# Patient Record
Sex: FEMALE
[De-identification: names, ages, dates, MRNs, and addresses within clinical notes are randomized per-mention and may not be internally consistent; named-entity substitution may affect disease eponyms.]

---

## 2020-08-28 ENCOUNTER — HOSPITAL ENCOUNTER (EMERGENCY)
Dept: HOSPITAL 5 - ED | Age: 25
Discharge: HOME | End: 2020-08-28
Payer: SELF-PAY

## 2020-08-28 VITALS — DIASTOLIC BLOOD PRESSURE: 81 MMHG | SYSTOLIC BLOOD PRESSURE: 124 MMHG

## 2020-08-28 DIAGNOSIS — F41.9: ICD-10-CM

## 2020-08-28 DIAGNOSIS — R00.2: ICD-10-CM

## 2020-08-28 DIAGNOSIS — Z88.6: ICD-10-CM

## 2020-08-28 DIAGNOSIS — N39.0: ICD-10-CM

## 2020-08-28 DIAGNOSIS — R53.1: ICD-10-CM

## 2020-08-28 DIAGNOSIS — B37.3: Primary | ICD-10-CM

## 2020-08-28 DIAGNOSIS — Z79.899: ICD-10-CM

## 2020-08-28 DIAGNOSIS — R11.0: ICD-10-CM

## 2020-08-28 LAB
ALBUMIN SERPL-MCNC: 4.8 G/DL (ref 3.9–5)
ALT SERPL-CCNC: 12 UNITS/L (ref 7–56)
BACTERIA #/AREA URNS HPF: (no result) /HPF
BASOPHILS # (AUTO): 0.1 K/MM3 (ref 0–0.1)
BASOPHILS NFR BLD AUTO: 0.5 % (ref 0–1.8)
BILIRUB UR QL STRIP: (no result)
BLOOD UR QL VISUAL: (no result)
BUN SERPL-MCNC: 11 MG/DL (ref 7–17)
BUN/CREAT SERPL: 14 %
CALCIUM SERPL-MCNC: 9.8 MG/DL (ref 8.4–10.2)
EOSINOPHIL # BLD AUTO: 0 K/MM3 (ref 0–0.4)
EOSINOPHIL NFR BLD AUTO: 0 % (ref 0–4.3)
HCT VFR BLD CALC: 44.9 % (ref 30.3–42.9)
HEMOLYSIS INDEX: 15
HGB BLD-MCNC: 15.6 GM/DL (ref 10.1–14.3)
HYALINE CASTS #/AREA URNS LPF: 6 /LPF
LYMPHOCYTES # BLD AUTO: 1.5 K/MM3 (ref 1.2–5.4)
LYMPHOCYTES NFR BLD AUTO: 11 % (ref 13.4–35)
MCHC RBC AUTO-ENTMCNC: 35 % (ref 30–34)
MCV RBC AUTO: 90 FL (ref 79–97)
MONOCYTES # (AUTO): 0.7 K/MM3 (ref 0–0.8)
MONOCYTES % (AUTO): 5.4 % (ref 0–7.3)
MUCOUS THREADS #/AREA URNS HPF: (no result) /HPF
PH UR STRIP: 6 [PH] (ref 5–7)
PLATELET # BLD: 277 K/MM3 (ref 140–440)
PROT UR STRIP-MCNC: (no result) MG/DL
RBC # BLD AUTO: 5 M/MM3 (ref 3.65–5.03)
RBC #/AREA URNS HPF: 14 /HPF (ref 0–6)
UROBILINOGEN UR-MCNC: < 2 MG/DL (ref ?–2)
WBC #/AREA URNS HPF: 40 /HPF (ref 0–6)

## 2020-08-28 PROCEDURE — 80053 COMPREHEN METABOLIC PANEL: CPT

## 2020-08-28 PROCEDURE — 83735 ASSAY OF MAGNESIUM: CPT

## 2020-08-28 PROCEDURE — 82550 ASSAY OF CK (CPK): CPT

## 2020-08-28 PROCEDURE — 81001 URINALYSIS AUTO W/SCOPE: CPT

## 2020-08-28 PROCEDURE — 84443 ASSAY THYROID STIM HORMONE: CPT

## 2020-08-28 PROCEDURE — 84702 CHORIONIC GONADOTROPIN TEST: CPT

## 2020-08-28 PROCEDURE — 87086 URINE CULTURE/COLONY COUNT: CPT

## 2020-08-28 PROCEDURE — 93005 ELECTROCARDIOGRAM TRACING: CPT

## 2020-08-28 PROCEDURE — 80307 DRUG TEST PRSMV CHEM ANLYZR: CPT

## 2020-08-28 PROCEDURE — 85025 COMPLETE CBC W/AUTO DIFF WBC: CPT

## 2020-08-28 PROCEDURE — 36415 COLL VENOUS BLD VENIPUNCTURE: CPT

## 2020-08-28 NOTE — EVENT NOTE
ED Screening Note


ED Screening Note: 





generalized weakness


nausea


palpitations/heart racing


couple weeks 


has episodes of anxious heart racing breathing fast


one episode of vomiting


no diarrhea


no fever


no cough 


no abd pain 


not on any medications currently 


caffeine use every day 


PMHx anxiety 


allergy: codeine 


LNMP:a month ago 





This initial assessment/diagnostic orders/clinical plan/treatment(s) is/are 

subject to change based on patients health status, clinical progression and re-

assessment by fellow clinical providers in the ED. Further treatment and workup 

at subsequent clinical providers discretion. Patient/guardian urged not to elope

from the ED as their condition may be serious if not clinically assessed and 

managed. 





Initial orders include: 


labs, UA, EKG

## 2020-08-28 NOTE — EMERGENCY DEPARTMENT REPORT
ED General Adult HPI





- General


Chief complaint: Weakness


Stated complaint: WEAK AND NAUSEA


Time Seen by Provider: 08/28/20 15:28


Source: patient


Mode of arrival: Ambulatory


Limitations: No Limitations





- History of Present Illness


Initial comments: 





pt is a 24 yo female who presents to the ED with c/o generalized weakness for 2 

weeks. she has associated nausea, palpitations/heart racing. she has episodes of

anxious heart racing and breathing fast. states that she had one episode of 

vomiting. she denies any CP, diarrhea, fever, SOB, leg swelling, cough 


or abd pain. not on any medications currently. caffeine use every day. PMHx 

anxiety. allergy: codeine. LNMP:a month ago





- Related Data


                                  Previous Rx's











 Medication  Instructions  Recorded  Last Taken  Type


 


Fluconazole [Diflucan TAB] 200 mg PO QDAY #1 tablet 08/28/20 Unknown Rx


 


cephALEXin [Keflex] 500 mg PO BID 7 Days #14 cap 08/28/20 Unknown Rx











                                    Allergies











Allergy/AdvReac Type Severity Reaction Status Date / Time


 


codeine AdvReac  Itching Verified 08/28/20 12:07














ED Review of Systems


ROS: 


Stated complaint: WEAK AND NAUSEA


Other details as noted in HPI





Comment: All other systems reviewed and negative





ED Past Medical Hx





- Past Medical History


Previous Medical History?: Yes


Additional medical history: anxiety





- Surgical History


Past Surgical History?: No





- Medications


Home Medications: 


                                Home Medications











 Medication  Instructions  Recorded  Confirmed  Last Taken  Type


 


Fluconazole [Diflucan TAB] 200 mg PO QDAY #1 tablet 08/28/20  Unknown Rx


 


cephALEXin [Keflex] 500 mg PO BID 7 Days #14 cap 08/28/20  Unknown Rx














ED Physical Exam





- General


Limitations: No Limitations


General appearance: alert, in no apparent distress





- Head


Head exam: Present: atraumatic, normocephalic





- Eye


Eye exam: Present: normal appearance





- ENT


ENT exam: Present: mucous membranes moist





- Respiratory


Respiratory exam: Present: normal lung sounds bilaterally.  Absent: respiratory 

distress, wheezes, rales, rhonchi, stridor, chest wall tenderness, accessory 

muscle use, decreased breath sounds, prolonged expiratory





- Cardiovascular


Cardiovascular Exam: Present: normal rhythm, tachycardia, normal heart sounds.  

Absent: systolic murmur, diastolic murmur, rubs, gallop





- Neurological Exam


Neurological exam: Present: alert, oriented X3, CN II-XII intact, normal gait.  

Absent: motor sensory deficit





- Psychiatric


Psychiatric exam: Present: normal affect, normal mood





- Skin


Skin exam: Present: warm, dry, intact





ED Course


                                   Vital Signs











  08/28/20 08/28/20





  12:06 16:32


 


Temperature 97.9 F 


 


Pulse Rate 141 H 94 H


 


Respiratory 18 





Rate  


 


Blood Pressure 128/85 


 


Blood Pressure  124/81





[Left]  


 


O2 Sat by Pulse 100 





Oximetry  














ED Medical Decision Making





- Lab Data


Result diagrams: 


                                 08/28/20 13:22





                                 08/28/20 13:22








                                   Lab Results











  08/28/20 08/28/20 08/28/20 Range/Units





  13:22 13:22 13:22 


 


WBC  13.8 H    (4.5-11.0)  K/mm3


 


RBC  5.00    (3.65-5.03)  M/mm3


 


Hgb  15.6 H    (10.1-14.3)  gm/dl


 


Hct  44.9 H    (30.3-42.9)  %


 


MCV  90    (79-97)  fl


 


MCH  31    (28-32)  pg


 


MCHC  35 H    (30-34)  %


 


RDW  12.4 L    (13.2-15.2)  %


 


Plt Count  277    (140-440)  K/mm3


 


Lymph % (Auto)  11.0 L    (13.4-35.0)  %


 


Mono % (Auto)  5.4    (0.0-7.3)  %


 


Eos % (Auto)  0.0    (0.0-4.3)  %


 


Baso % (Auto)  0.5    (0.0-1.8)  %


 


Lymph #  1.5    (1.2-5.4)  K/mm3


 


Mono #  0.7    (0.0-0.8)  K/mm3


 


Eos #  0.0    (0.0-0.4)  K/mm3


 


Baso #  0.1    (0.0-0.1)  K/mm3


 


Seg Neutrophils %  83.1 H    (40.0-70.0)  %


 


Seg Neutrophils #  11.5 H    (1.8-7.7)  K/mm3


 


Sodium   135 L   (137-145)  mmol/L


 


Potassium   4.5   (3.6-5.0)  mmol/L


 


Chloride   98.4   ()  mmol/L


 


Carbon Dioxide   21 L   (22-30)  mmol/L


 


Anion Gap   20   mmol/L


 


BUN   11   (7-17)  mg/dL


 


Creatinine   0.8   (0.6-1.2)  mg/dL


 


Estimated GFR   > 60   ml/min


 


BUN/Creatinine Ratio   14   %


 


Glucose   101 H   ()  mg/dL


 


Calcium   9.8   (8.4-10.2)  mg/dL


 


Magnesium   2.30   (1.7-2.3)  mg/dL


 


Total Bilirubin   0.90   (0.1-1.2)  mg/dL


 


AST   17   (5-40)  units/L


 


ALT   12   (7-56)  units/L


 


Alkaline Phosphatase   75   ()  units/L


 


Total Creatine Kinase   40   ()  units/L


 


Total Protein   8.0   (6.3-8.2)  g/dL


 


Albumin   4.8   (3.9-5)  g/dL


 


Albumin/Globulin Ratio   1.5   %


 


TSH    0.442  (0.270-4.200)  mlU/mL


 


HCG, Quant     (0-4)  mIU/mL


 


Urine Color     (Yellow)  


 


Urine Turbidity     (Clear)  


 


Urine pH     (5.0-7.0)  


 


Ur Specific Gravity     (1.003-1.030)  


 


Urine Protein     (Negative)  mg/dL


 


Urine Glucose (UA)     (Negative)  mg/dL


 


Urine Ketones     (Negative)  mg/dL


 


Urine Blood     (Negative)  


 


Urine Nitrite     (Negative)  


 


Urine Bilirubin     (Negative)  


 


Urine Urobilinogen     (<2.0)  mg/dL


 


Ur Leukocyte Esterase     (Negative)  


 


Urine WBC (Auto)     (0.0-6.0)  /HPF


 


Urine RBC (Auto)     (0.0-6.0)  /HPF


 


U Epithel Cells (Auto)     (0-13.0)  /HPF


 


Urine Bacteria (Auto)     (Negative)  /HPF


 


Hyaline Casts     /LPF


 


Urine Mucus     /HPF


 


Urine Yeast (Budding)     /HPF


 


Urine Opiates Screen     


 


Urine Methadone Screen     


 


Ur Barbiturates Screen     


 


Ur Phencyclidine Scrn     


 


Ur Amphetamines Screen     


 


U Benzodiazepines Scrn     


 


Urine Cocaine Screen     


 


U Marijuana (THC) Screen     


 


Drugs of Abuse Note     














  08/28/20 08/28/20 08/28/20 Range/Units





  13:22 15:26 15:26 


 


WBC     (4.5-11.0)  K/mm3


 


RBC     (3.65-5.03)  M/mm3


 


Hgb     (10.1-14.3)  gm/dl


 


Hct     (30.3-42.9)  %


 


MCV     (79-97)  fl


 


MCH     (28-32)  pg


 


MCHC     (30-34)  %


 


RDW     (13.2-15.2)  %


 


Plt Count     (140-440)  K/mm3


 


Lymph % (Auto)     (13.4-35.0)  %


 


Mono % (Auto)     (0.0-7.3)  %


 


Eos % (Auto)     (0.0-4.3)  %


 


Baso % (Auto)     (0.0-1.8)  %


 


Lymph #     (1.2-5.4)  K/mm3


 


Mono #     (0.0-0.8)  K/mm3


 


Eos #     (0.0-0.4)  K/mm3


 


Baso #     (0.0-0.1)  K/mm3


 


Seg Neutrophils %     (40.0-70.0)  %


 


Seg Neutrophils #     (1.8-7.7)  K/mm3


 


Sodium     (137-145)  mmol/L


 


Potassium     (3.6-5.0)  mmol/L


 


Chloride     ()  mmol/L


 


Carbon Dioxide     (22-30)  mmol/L


 


Anion Gap     mmol/L


 


BUN     (7-17)  mg/dL


 


Creatinine     (0.6-1.2)  mg/dL


 


Estimated GFR     ml/min


 


BUN/Creatinine Ratio     %


 


Glucose     ()  mg/dL


 


Calcium     (8.4-10.2)  mg/dL


 


Magnesium     (1.7-2.3)  mg/dL


 


Total Bilirubin     (0.1-1.2)  mg/dL


 


AST     (5-40)  units/L


 


ALT     (7-56)  units/L


 


Alkaline Phosphatase     ()  units/L


 


Total Creatine Kinase     ()  units/L


 


Total Protein     (6.3-8.2)  g/dL


 


Albumin     (3.9-5)  g/dL


 


Albumin/Globulin Ratio     %


 


TSH     (0.270-4.200)  mlU/mL


 


HCG, Quant  < 2    (0-4)  mIU/mL


 


Urine Color   Yellow   (Yellow)  


 


Urine Turbidity   Cloudy   (Clear)  


 


Urine pH   6.0   (5.0-7.0)  


 


Ur Specific Gravity   1.013   (1.003-1.030)  


 


Urine Protein   <15 mg/dl   (Negative)  mg/dL


 


Urine Glucose (UA)   Neg   (Negative)  mg/dL


 


Urine Ketones   20   (Negative)  mg/dL


 


Urine Blood   Sm   (Negative)  


 


Urine Nitrite   Neg   (Negative)  


 


Urine Bilirubin   Neg   (Negative)  


 


Urine Urobilinogen   < 2.0   (<2.0)  mg/dL


 


Ur Leukocyte Esterase   Lg   (Negative)  


 


Urine WBC (Auto)   40.0 H   (0.0-6.0)  /HPF


 


Urine RBC (Auto)   14.0   (0.0-6.0)  /HPF


 


U Epithel Cells (Auto)   47.0 H   (0-13.0)  /HPF


 


Urine Bacteria (Auto)   4+   (Negative)  /HPF


 


Hyaline Casts   6   /LPF


 


Urine Mucus   Few   /HPF


 


Urine Yeast (Budding)   2+   /HPF


 


Urine Opiates Screen    Negative  


 


Urine Methadone Screen    Negative  


 


Ur Barbiturates Screen    Negative  


 


Ur Phencyclidine Scrn    Negative  


 


Ur Amphetamines Screen    Negative  


 


U Benzodiazepines Scrn    Negative  


 


Urine Cocaine Screen    Negative  


 


U Marijuana (THC) Screen    Negative  


 


Drugs of Abuse Note    Disclamer  














- EKG Data


EKG shows normal: sinus rhythm, axis, intervals, QRS complexes, ST-T waves


Rate: normal





- Medical Decision Making





pt is a 24 yo female who presents to the ED with c/o generalized weakness for 2 

weeks. she has associated nausea, palpitations/heart racing. she has episodes of

 anxious heart racing and breathing fast. states that she had one episode of 

vomiting. she denies any CP, diarrhea, fever, SOB, leg swelling, cough 


or abd pain. not on any medications currently. caffeine use every day. PMHx 

anxiety. allergy: codeine. LNMP:a month ago.  Initial vitals with tachycardia 

which improved to normal upon repeat.  Labs are stable.  UA shows evidence of 

UTI, there are many epithelial cells, could be due to contamination, but there 

are many white blood cells and leukocyte esterase, patient will be covered for 

UTI, urine culture was sent, there is also yeast present.  UDS is negative.  EKG

 is within normal limits.  Labs without evidence of anemia, significant 

dehydration, electrolyte disturbance.  Discussed all results with patient and 

answered questions.  Patient will be referred to primary care physician and 

cardiologist and the [facility] for anxiety.  Patient does not have any PE 

risk factors, she is low risk based on Wells criteria for PE.  Patient given 

prescription for Keflex and fluconazole.  Advised patient Please take medication

 as prescribed.  Please increase your fluid intake.  Please avoid caffeine use 

which includes sodas, tea, energy drinks.  Follow-up with a primary care doctor.

  Follow-up with a cardiologist.  Follow-up with the [facility].  Return to 

emergency room for any new or worsening symptoms.





- Differential Diagnosis


Anemia, IVET, electrolyte disturbance, arrhythmia, dehydration, anxiety


Critical care attestation.: 


If time is entered above; I have spent that time in minutes in the direct care 

of this critically ill patient, excluding procedure time.








ED Disposition


Clinical Impression: 


 Palpitations, Generalized weakness, Nausea, Yeast vaginitis





UTI (urinary tract infection)


Qualifiers:


 Urinary tract infection type: acute cystitis Hematuria presence: without 

hematuria Qualified Code(s): N30.00 - Acute cystitis without hematuria





Disposition: DC-01 TO HOME OR SELFCARE


Is pt being admited?: No


Does the pt Need Aspirin: No


Condition: Stable


Instructions:  Palpitations (ED), Urinary Tract Infection in Women (ED), Anxiety

 (ED)


Additional Instructions: 


Please take medication as prescribed.  Please increase your fluid intake.  

Please avoid caffeine use which includes sodas, tea, energy drinks.  Follow-up 

with a primary care doctor.  Follow-up with a cardiologist.  Follow-up with the 

[facility].  Return to emergency room for any new or worsening symptoms.


Prescriptions: 


Fluconazole [Diflucan TAB] 200 mg PO QDAY #1 tablet


cephALEXin [Keflex] 500 mg PO BID 7 Days #14 cap


Referrals: 


[facility] - 2-3 Days


MAE ATKINS MD [Staff Physician] - 2-3 Days


[facility] [Provider Group] - 2-3 Days


GAURAV ALEXIS MD [Staff Physician] - 2-3 Days


[facility] [Outside] - 2-3 Days


Time of Disposition: 17:07


Print Language: ENGLISH